# Patient Record
Sex: FEMALE | Race: WHITE | NOT HISPANIC OR LATINO | Employment: FULL TIME | URBAN - METROPOLITAN AREA
[De-identification: names, ages, dates, MRNs, and addresses within clinical notes are randomized per-mention and may not be internally consistent; named-entity substitution may affect disease eponyms.]

---

## 2017-02-20 ENCOUNTER — RADIANT APPOINTMENT (OUTPATIENT)
Dept: GENERAL RADIOLOGY | Facility: CLINIC | Age: 22
End: 2017-02-20
Attending: PHYSICIAN ASSISTANT
Payer: COMMERCIAL

## 2017-02-20 ENCOUNTER — OFFICE VISIT (OUTPATIENT)
Dept: URGENT CARE | Facility: URGENT CARE | Age: 22
End: 2017-02-20
Payer: COMMERCIAL

## 2017-02-20 VITALS
SYSTOLIC BLOOD PRESSURE: 122 MMHG | DIASTOLIC BLOOD PRESSURE: 60 MMHG | WEIGHT: 164 LBS | BODY MASS INDEX: 27.29 KG/M2 | TEMPERATURE: 98.3 F | HEART RATE: 104 BPM | OXYGEN SATURATION: 97 %

## 2017-02-20 DIAGNOSIS — R07.89 ATYPICAL CHEST PAIN: ICD-10-CM

## 2017-02-20 DIAGNOSIS — Z82.49 FAMILY HISTORY OF HYPERTROPHIC CARDIOMYOPATHY: ICD-10-CM

## 2017-02-20 DIAGNOSIS — R07.89 ATYPICAL CHEST PAIN: Primary | ICD-10-CM

## 2017-02-20 DIAGNOSIS — M54.6 ACUTE BILATERAL THORACIC BACK PAIN: ICD-10-CM

## 2017-02-20 LAB
ANION GAP SERPL CALCULATED.3IONS-SCNC: 5 MMOL/L (ref 3–14)
BASOPHILS # BLD AUTO: 0 10E9/L (ref 0–0.2)
BASOPHILS NFR BLD AUTO: 0.2 %
BUN SERPL-MCNC: 15 MG/DL (ref 7–30)
CALCIUM SERPL-MCNC: 9.5 MG/DL (ref 8.5–10.1)
CHLORIDE SERPL-SCNC: 106 MMOL/L (ref 94–109)
CO2 SERPL-SCNC: 28 MMOL/L (ref 20–32)
CREAT SERPL-MCNC: 0.69 MG/DL (ref 0.52–1.04)
D DIMER PPP FEU-MCNC: NORMAL UG/ML FEU (ref 0–0.5)
DIFFERENTIAL METHOD BLD: NORMAL
EOSINOPHIL # BLD AUTO: 0.1 10E9/L (ref 0–0.7)
EOSINOPHIL NFR BLD AUTO: 0.7 %
ERYTHROCYTE [DISTWIDTH] IN BLOOD BY AUTOMATED COUNT: 12.3 % (ref 10–15)
ERYTHROCYTE [SEDIMENTATION RATE] IN BLOOD BY WESTERGREN METHOD: 8 MM/H (ref 0–20)
GFR SERPL CREATININE-BSD FRML MDRD: NORMAL ML/MIN/1.7M2
GLUCOSE SERPL-MCNC: 85 MG/DL (ref 70–99)
HCT VFR BLD AUTO: 44 % (ref 35–47)
HGB BLD-MCNC: 15.4 G/DL (ref 11.7–15.7)
LYMPHOCYTES # BLD AUTO: 3.3 10E9/L (ref 0.8–5.3)
LYMPHOCYTES NFR BLD AUTO: 38.2 %
MCH RBC QN AUTO: 31.2 PG (ref 26.5–33)
MCHC RBC AUTO-ENTMCNC: 35 G/DL (ref 31.5–36.5)
MCV RBC AUTO: 89 FL (ref 78–100)
MONOCYTES # BLD AUTO: 0.7 10E9/L (ref 0–1.3)
MONOCYTES NFR BLD AUTO: 7.4 %
NEUTROPHILS # BLD AUTO: 4.7 10E9/L (ref 1.6–8.3)
NEUTROPHILS NFR BLD AUTO: 53.5 %
PLATELET # BLD AUTO: 278 10E9/L (ref 150–450)
POTASSIUM SERPL-SCNC: 3.7 MMOL/L (ref 3.4–5.3)
RBC # BLD AUTO: 4.94 10E12/L (ref 3.8–5.2)
SODIUM SERPL-SCNC: 139 MMOL/L (ref 133–144)
WBC # BLD AUTO: 8.7 10E9/L (ref 4–11)

## 2017-02-20 PROCEDURE — 71020 XR CHEST 2 VW: CPT

## 2017-02-20 PROCEDURE — 99215 OFFICE O/P EST HI 40 MIN: CPT | Performed by: PHYSICIAN ASSISTANT

## 2017-02-20 PROCEDURE — 93000 ELECTROCARDIOGRAM COMPLETE: CPT | Performed by: PHYSICIAN ASSISTANT

## 2017-02-20 PROCEDURE — 85379 FIBRIN DEGRADATION QUANT: CPT | Performed by: PHYSICIAN ASSISTANT

## 2017-02-20 PROCEDURE — 80048 BASIC METABOLIC PNL TOTAL CA: CPT | Performed by: PHYSICIAN ASSISTANT

## 2017-02-20 PROCEDURE — 36415 COLL VENOUS BLD VENIPUNCTURE: CPT | Performed by: PHYSICIAN ASSISTANT

## 2017-02-20 PROCEDURE — 85652 RBC SED RATE AUTOMATED: CPT | Performed by: PHYSICIAN ASSISTANT

## 2017-02-20 PROCEDURE — 85025 COMPLETE CBC W/AUTO DIFF WBC: CPT | Performed by: PHYSICIAN ASSISTANT

## 2017-02-20 RX ORDER — NAPROXEN 500 MG/1
500 TABLET ORAL 2 TIMES DAILY PRN
Qty: 30 TABLET | Refills: 1 | Status: SHIPPED | OUTPATIENT
Start: 2017-02-20 | End: 2017-10-02

## 2017-02-20 NOTE — PROGRESS NOTES
SUBJECTIVE:   Deonna Cullen is a 21 year old female presenting with a chief complaint of having anterior chest and back pain with breathing, movements and twisting.  Onset of symptoms was this morning .  Course of illness is the same.    Severity moderate  Current and Associated symptoms: anterior and posterior chest wall pain  Treatment measures tried include none.  Predisposing factors include none.    PMH:  Anxiety    Fam Hx:  cardiomyopathy     Allergies   Allergen Reactions     Nkda [No Known Drug Allergies]          Social History   Substance Use Topics     Smoking status: Passive Smoke Exposure - Never Smoker     Smokeless tobacco: Never Used      Comment: Mom smokes     Alcohol use No       ROS:  CONSTITUTIONAL:NEGATIVE for fever, chills, change in weight  INTEGUMENTARY/SKIN: NEGATIVE for worrisome rashes, moles or lesions  ENT/MOUTH: NEGATIVE for ear, mouth and throat problems  RESP:NEGATIVE for significant cough or SOB  CV: NEGATIVE for chest pain, palpitations or peripheral edema  GI: NEGATIVE for nausea, abdominal pain, heartburn, or change in bowel habits  MUSCULOSKELETAL: POSITIVE  for anterior and posterior chest and back pain  VASC: NEGATIVE for cold extremities  PSYCH: POSITIVE for a great deal of anxiety  NEURO: NEGATIVE for weakness, dizziness or paresthesias    OBJECTIVE  :/60 (BP Location: Right arm, Patient Position: Chair, Cuff Size: Adult Regular)  Pulse 104  Temp 98.3  F (36.8  C) (Oral)  Wt 164 lb (74.4 kg)  SpO2 97%  BMI 27.29 kg/m2  GENERAL APPEARANCE: healthy, alert and no distress  HENT: ear canals and TM's normal.  Nose and mouth without ulcers, erythema or lesions  NECK: supple, nontender, no lymphadenopathy  RESP: lungs clear to auscultation - no rales, rhonchi or wheezes  CV: regular rates and rhythm, normal S1 S2, no murmur noted  ABDOMEN:  soft, nontender, no HSM or masses and bowel sounds normal  MS: Positive for anterior chest wall and posterior back, thoracic pain with  movements, exacerbation with palpation  NEURO: Normal strength and tone, sensory exam grossly normal,  normal speech and mentation  SKIN: no suspicious lesions or rashes    Chest xray Negative for acute findings, read by Buddy ROGERS at time of visit.    Results for orders placed or performed in visit on 02/20/17   D dimer quantitative   Result Value Ref Range    D Dimer  0.0 - 0.50 ug/ml FEU     <0.1  This D-dimer assay is intended for use in conjuntion with a clinical pretest   probability assessment model to exclude pulmonary embolism (PE) and as an aid   in the diagnosis of deep venous thrombosis (DVT) in outpatients suspected of PE   or DVT. The cut-off value is 0.5 g/mL FEU.     Erythrocyte sedimentation rate auto   Result Value Ref Range    Sed Rate 8 0 - 20 mm/h   CBC with platelets and differential   Result Value Ref Range    WBC 8.7 4.0 - 11.0 10e9/L    RBC Count 4.94 3.8 - 5.2 10e12/L    Hemoglobin 15.4 11.7 - 15.7 g/dL    Hematocrit 44.0 35.0 - 47.0 %    MCV 89 78 - 100 fl    MCH 31.2 26.5 - 33.0 pg    MCHC 35.0 31.5 - 36.5 g/dL    RDW 12.3 10.0 - 15.0 %    Platelet Count 278 150 - 450 10e9/L    Diff Method Automated Method     % Neutrophils 53.5 %    % Lymphocytes 38.2 %    % Monocytes 7.4 %    % Eosinophils 0.7 %    % Basophils 0.2 %    Absolute Neutrophil 4.7 1.6 - 8.3 10e9/L    Absolute Lymphocytes 3.3 0.8 - 5.3 10e9/L    Absolute Monocytes 0.7 0.0 - 1.3 10e9/L    Absolute Eosinophils 0.1 0.0 - 0.7 10e9/L    Absolute Basophils 0.0 0.0 - 0.2 10e9/L   Basic metabolic panel  (Ca, Cl, CO2, Creat, Gluc, K, Na, BUN)   Result Value Ref Range    Sodium 139 133 - 144 mmol/L    Potassium 3.7 3.4 - 5.3 mmol/L    Chloride 106 94 - 109 mmol/L    Carbon Dioxide 28 20 - 32 mmol/L    Anion Gap 5 3 - 14 mmol/L    Glucose 85 70 - 99 mg/dL    Urea Nitrogen 15 7 - 30 mg/dL    Creatinine 0.69 0.52 - 1.04 mg/dL    GFR Estimate >90  Non  GFR Calc   >60 mL/min/1.7m2    GFR Estimate If Black  >90   GFR Calc   >60 mL/min/1.7m2    Calcium 9.5 8.5 - 10.1 mg/dL     EKG : Negative for ST changes or peaked t - waves.  Regular rate and rhythm    ASSESSMENT/PLAN:      ICD-10-CM    1. Atypical chest pain R07.89 BIOTIN PO     EKG 12-lead complete w/read - Clinics     D dimer quantitative     Erythrocyte sedimentation rate auto     XR Chest 2 Views     CBC with platelets and differential     Basic metabolic panel  (Ca, Cl, CO2, Creat, Gluc, K, Na, BUN)     naproxen (NAPROSYN) 500 MG tablet   2. Acute bilateral thoracic back pain M54.6 BIOTIN PO     EKG 12-lead complete w/read - Clinics     D dimer quantitative     Erythrocyte sedimentation rate auto     XR Chest 2 Views     CBC with platelets and differential     Basic metabolic panel  (Ca, Cl, CO2, Creat, Gluc, K, Na, BUN)     naproxen (NAPROSYN) 500 MG tablet   3. Family history of hypertrophic cardiomyopathy Z82.49        Patient to follow up with her [facility] physician for evaluation for cardiomyopathy.  At this time this appears to be more musculoskeletal in nature  She is to try anti-inflammatory  If any symptoms were to worsen then go to the ED for further evaluation

## 2017-02-20 NOTE — MR AVS SNAPSHOT
"              After Visit Summary   2017    Deonna Cullen    MRN: [id number]           Patient Information     Date Of Birth          1995        Visit Information        Provider Department      2017 12:15 PM Buddy Pedraza PA-C [facility]        Today's Diagnoses     Atypical chest pain    -  1    Acute bilateral thoracic back pain        Family history of hypertrophic cardiomyopathy           Follow-ups after your visit        Who to contact     If you have questions or need follow up information about today's clinic visit or your schedule please contact [facility] directly at [phone].  Normal or non-critical lab and imaging results will be communicated to you by wiMANhart, letter or phone within 4 business days after the clinic has received the results. If you do not hear from us within 7 days, please contact the clinic through wiMANhart or phone. If you have a critical or abnormal lab result, we will notify you by phone as soon as possible.  Submit refill requests through KoalaDeal or call your pharmacy and they will forward the refill request to us. Please allow 3 business days for your refill to be completed.          Additional Information About Your Visit        MyChart Information     KoalaDeal lets you send messages to your doctor, view your test results, renew your prescriptions, schedule appointments and more. To sign up, go to [email or web address] . Click on \"Log in\" on the left side of the screen, which will take you to the Welcome page. Then click on \"Sign up Now\" on the right side of the page.     You will be asked to enter the access code listed below, as well as some personal information. Please follow the directions to create your username and password.     Your access code is: [email or web address]  Expires: 2017  2:47 PM     Your access code will  in 90 days. If you need help or a new code, please call your [place] " clinic or [phone].        Care EveryWhere ID     This is your Care EveryWhere ID. This could be used by other organizations to access your [place] medical records  [id number]        Your Vitals Were     Pulse Temperature Pulse Oximetry BMI (Body Mass Index)          104 98.3  F (36.8  C) (Oral) 97% 27.29 kg/m2         Blood Pressure from Last 3 Encounters:   02/20/17 122/60   04/18/16 98/62   10/07/15 121/71    Weight from Last 3 Encounters:   02/20/17 164 lb (74.4 kg)   04/18/16 157 lb 14.4 oz (71.6 kg)   04/13/15 147 lb 4.8 oz (66.8 kg)              We Performed the Following     Basic metabolic panel  (Ca, Cl, CO2, Creat, Gluc, K, Na, BUN)     CBC with platelets and differential     D dimer quantitative     EKG 12-lead complete w/read - Clinics     Erythrocyte sedimentation rate auto          Today's Medication Changes          These changes are accurate as of: 2/20/17  2:47 PM.  If you have any questions, ask your nurse or doctor.               Start taking these medicines.        Dose/Directions    naproxen 500 MG tablet   Commonly known as:  NAPROSYN   Used for:  Atypical chest pain, Acute bilateral thoracic back pain   Started by:  Buddy Pedraza, DEVANG        Dose:  500 mg   Take 1 tablet (500 mg) by mouth 2 times daily as needed for moderate pain   Quantity:  30 tablet   Refills:  1            Where to get your medicines      These medications were sent to [facility] [address] [place] [address] [address] AT [facility] [place]  [address], [facility] [address]     Phone:  [phone]     naproxen 500 MG tablet                Primary Care Provider    Physician No Ref-Primary       No address on file        Thank you!     Thank you for choosing [facility]  for your care. Our goal is always to provide you with excellent care. Hearing back from our patients is one way we can continue to improve our services. Please take a few  minutes to complete the written survey that you may receive in the mail after your visit with us. Thank you!             Your Updated Medication List - Protect others around you: Learn how to safely use, store and throw away your medicines at www.disposemymeds.org.          This list is accurate as of: 2/20/17  2:47 PM.  Always use your most recent med list.                   Brand Name Dispense Instructions for use    BIOTIN PO          naproxen 500 MG tablet    NAPROSYN    30 tablet    Take 1 tablet (500 mg) by mouth 2 times daily as needed for moderate pain       ondansetron 4 MG ODT tab    ZOFRAN ODT    12 tablet    Take 1-2 tablets (4-8 mg) by mouth every 8 hours as needed for nausea       PROVENTIL  (90 BASE) MCG/ACT Inhaler   Generic drug:  albuterol     1 Inhaler    Inhale 2 puffs into the lungs every 6 hours.

## 2017-02-20 NOTE — NURSING NOTE
"Chief Complaint   Patient presents with     Chest Pain      started suddenly today, radiates through chest to her back, hurts worse when inhaling        Initial /60 (BP Location: Right arm, Patient Position: Chair, Cuff Size: Adult Regular)  Pulse 104  Temp 98.3  F (36.8  C) (Oral)  Wt 164 lb (74.4 kg)  SpO2 97%  BMI 27.29 kg/m2 Estimated body mass index is 27.29 kg/(m^2) as calculated from the following:    Height as of 4/13/15: 5' 5\" (1.651 m).    Weight as of this encounter: 164 lb (74.4 kg).  Medication Reconciliation: complete    "

## 2017-10-02 ENCOUNTER — OFFICE VISIT (OUTPATIENT)
Dept: URGENT CARE | Facility: URGENT CARE | Age: 22
End: 2017-10-02
Payer: COMMERCIAL

## 2017-10-02 VITALS
SYSTOLIC BLOOD PRESSURE: 100 MMHG | HEART RATE: 68 BPM | WEIGHT: 165 LBS | OXYGEN SATURATION: 97 % | DIASTOLIC BLOOD PRESSURE: 60 MMHG | TEMPERATURE: 98.4 F | BODY MASS INDEX: 27.46 KG/M2

## 2017-10-02 DIAGNOSIS — R09.89 CHEST CONGESTION: ICD-10-CM

## 2017-10-02 DIAGNOSIS — R05.8 PRODUCTIVE COUGH: Primary | ICD-10-CM

## 2017-10-02 DIAGNOSIS — R07.0 THROAT PAIN: ICD-10-CM

## 2017-10-02 LAB
DEPRECATED S PYO AG THROAT QL EIA: NORMAL
SPECIMEN SOURCE: NORMAL

## 2017-10-02 PROCEDURE — 87081 CULTURE SCREEN ONLY: CPT | Performed by: PHYSICIAN ASSISTANT

## 2017-10-02 PROCEDURE — 87880 STREP A ASSAY W/OPTIC: CPT | Performed by: PHYSICIAN ASSISTANT

## 2017-10-02 PROCEDURE — 99214 OFFICE O/P EST MOD 30 MIN: CPT | Performed by: PHYSICIAN ASSISTANT

## 2017-10-02 RX ORDER — AZITHROMYCIN 250 MG/1
TABLET, FILM COATED ORAL
Qty: 6 TABLET | Refills: 0 | Status: SHIPPED | OUTPATIENT
Start: 2017-10-02

## 2017-10-02 RX ORDER — IBUPROFEN 200 MG
200 TABLET ORAL EVERY 4 HOURS PRN
COMMUNITY

## 2017-10-02 NOTE — NURSING NOTE
"Chief Complaint   Patient presents with     URI     Pt c/o cough,sore throat and intermittent fever X 4-5 days.     Urgent Care       Initial /60  Pulse 68  Temp 98.4  F (36.9  C)  Wt 165 lb (74.8 kg)  SpO2 97%  BMI 27.46 kg/m2 Estimated body mass index is 27.46 kg/(m^2) as calculated from the following:    Height as of 4/13/15: 5' 5\" (1.651 m).    Weight as of this encounter: 165 lb (74.8 kg).  Medication Reconciliation: complete    "

## 2017-10-02 NOTE — PROGRESS NOTES
SUBJECTIVE:   Deonna Cullen is a 22 year old female presenting with a chief complaint of sore throat, sinus and chest congestion.  Onset of symptoms was over 1 week ago.  Course of illness is worsening.    Severity moderate  Current and Associated symptoms: sinus congestion, sinus pain, throat pain  Treatment measures tried include OTC medications.  Predisposing factors include recent illness.    No past medical history on file.     Allergies   Allergen Reactions     Nkda [No Known Drug Allergies]          Social History   Substance Use Topics     Smoking status: Never Smoker     Smokeless tobacco: Never Used     Alcohol use No       ROS:  CONSTITUTIONAL:NEGATIVE for fever, chills, change in weight  INTEGUMENTARY/SKIN: NEGATIVE for worrisome rashes, moles or lesions  ENT/MOUTH: POSITIVE for throat pain, sinus congestion  RESP:NEGATIVE for significant cough or SOB  CV: NEGATIVE for chest pain, palpitations or peripheral edema  GI: NEGATIVE for nausea, abdominal pain, heartburn, or change in bowel habits  MUSCULOSKELETAL: NEGATIVE for significant arthralgias or myalgia  NEURO: NEGATIVE for weakness, dizziness or paresthesias    OBJECTIVE  :/60  Pulse 68  Temp 98.4  F (36.9  C)  Wt 165 lb (74.8 kg)  SpO2 97%  BMI 27.46 kg/m2  GENERAL APPEARANCE: healthy, alert and no distress  EYES: EOMI,  PERRL, conjunctiva clear  HENT: TM's normal bilaterally, nasal turbinates erythematous, swollen, rhinorrhea purulent, frontal sinus tenderness  and maxillary sinus tenderness   NECK: supple, nontender, no lymphadenopathy  RESP: lungs clear to auscultation - no rales, rhonchi or wheezes  CV: regular rates and rhythm, normal S1 S2, no murmur noted  NEURO: Normal strength and tone, sensory exam grossly normal,  normal speech and mentation  SKIN: no suspicious lesions or rashes    Results for orders placed or performed in visit on 10/02/17   Strep, Rapid Screen   Result Value Ref Range    Specimen Description Throat     Rapid  Strep A Screen       NEGATIVE: No Group A streptococcal antigen detected by immunoassay, await culture report.       ASSESSMENT/PLAN:      ICD-10-CM    1. Productive cough R05 azithromycin (ZITHROMAX) 250 MG tablet     Beta strep group A culture   2. Chest congestion R09.89 azithromycin (ZITHROMAX) 250 MG tablet   3. Throat pain R07.0 MAGIC MOUTHWASH, ENTER INGREDIENTS IN COMMENTS,     Strep, Rapid Screen     Beta strep group A culture       Strep culture pending  Fluids, rest  Follow up with PCP as needed

## 2017-10-03 LAB
BACTERIA SPEC CULT: NORMAL
SPECIMEN SOURCE: NORMAL

## 2017-10-09 ENCOUNTER — OFFICE VISIT (OUTPATIENT)
Dept: URGENT CARE | Facility: URGENT CARE | Age: 22
End: 2017-10-09
Payer: COMMERCIAL

## 2017-10-09 VITALS
BODY MASS INDEX: 27.07 KG/M2 | SYSTOLIC BLOOD PRESSURE: 118 MMHG | WEIGHT: 162.5 LBS | OXYGEN SATURATION: 98 % | TEMPERATURE: 98.8 F | DIASTOLIC BLOOD PRESSURE: 76 MMHG | HEIGHT: 65 IN | HEART RATE: 109 BPM

## 2017-10-09 DIAGNOSIS — R09.89 CHEST CONGESTION: ICD-10-CM

## 2017-10-09 DIAGNOSIS — R05.8 PRODUCTIVE COUGH: Primary | ICD-10-CM

## 2017-10-09 DIAGNOSIS — R05.9 COUGH: ICD-10-CM

## 2017-10-09 DIAGNOSIS — R52 BODY ACHES: ICD-10-CM

## 2017-10-09 PROCEDURE — 99214 OFFICE O/P EST MOD 30 MIN: CPT | Performed by: PHYSICIAN ASSISTANT

## 2017-10-09 RX ORDER — CODEINE PHOSPHATE AND GUAIFENESIN 10; 100 MG/5ML; MG/5ML
5-10 SOLUTION ORAL
Qty: 120 ML | Refills: 0 | Status: SHIPPED | OUTPATIENT
Start: 2017-10-09

## 2017-10-09 RX ORDER — AZITHROMYCIN 250 MG/1
TABLET, FILM COATED ORAL
Qty: 6 TABLET | Refills: 0
Start: 2017-10-09

## 2017-10-09 RX ORDER — IBUPROFEN 800 MG/1
800 TABLET, FILM COATED ORAL EVERY 8 HOURS PRN
Qty: 30 TABLET | Refills: 1 | Status: SHIPPED | OUTPATIENT
Start: 2017-10-09

## 2017-10-09 NOTE — NURSING NOTE
"Chief Complaint   Patient presents with     URI       Initial /76  Pulse 109  Temp 98.8  F (37.1  C) (Oral)  Ht 5' 5\" (1.651 m)  Wt 162 lb 8 oz (73.7 kg)  LMP 09/22/2017  SpO2 98%  BMI 27.04 kg/m2 Estimated body mass index is 27.04 kg/(m^2) as calculated from the following:    Height as of this encounter: 5' 5\" (1.651 m).    Weight as of this encounter: 162 lb 8 oz (73.7 kg).  Medication Reconciliation: complete   Ritika Greer MA   "

## 2017-10-09 NOTE — LETTER
[facility]  [address] [address]  [phone]      October 13, 2017    RE:  Deonna Cullen                                                                                                                                                       [address]   [facility] [address]            To whom it may concern:    Deonna Cullen was seen in the urgent care today for an illness 10/9.  She is requesting a note to be off work this week due to her illness.        Sincerely,        Buddy Pedraza [facility]

## 2017-10-09 NOTE — MR AVS SNAPSHOT
"              After Visit Summary   10/9/2017    Deonna Cullen    MRN: [id number]           Patient Information     Date Of Birth          1995        Visit Information        Provider Department      10/9/2017 10:10 AM Buddy Pedraza PA-C [facility]        Today's Diagnoses     Productive cough    -  1    Chest congestion        Body aches        Cough           Follow-ups after your visit        Who to contact     If you have questions or need follow up information about today's clinic visit or your schedule please contact [facility] directly at [phone].  Normal or non-critical lab and imaging results will be communicated to you by Bellabeathart, letter or phone within 4 business days after the clinic has received the results. If you do not hear from us within 7 days, please contact the clinic through Bellabeathart or phone. If you have a critical or abnormal lab result, we will notify you by phone as soon as possible.  Submit refill requests through [employer] or call your pharmacy and they will forward the refill request to us. Please allow 3 business days for your refill to be completed.          Additional Information About Your Visit        MyChart Information     [employer] lets you send messages to your doctor, view your test results, renew your prescriptions, schedule appointments and more. To sign up, go to [email or web address] . Click on \"Log in\" on the left side of the screen, which will take you to the Welcome page. Then click on \"Sign up Now\" on the right side of the page.     You will be asked to enter the access code listed below, as well as some personal information. Please follow the directions to create your username and password.     Your access code is: [email or web address]  Expires: 2017 11:23 AM     Your access code will  in 90 days. If you need help or a new code, please call your [place] clinic or [phone].        Care " "EveryWhere ID     This is your Care EveryWhere ID. This could be used by other organizations to access your [place] medical records  [id number]        Your Vitals Were     Pulse Temperature Height Last Period Pulse Oximetry BMI (Body Mass Index)    109 98.8  F (37.1  C) (Oral) 5' 5\" (1.651 m) 09/22/2017 98% 27.04 kg/m2       Blood Pressure from Last 3 Encounters:   10/09/17 118/76   10/02/17 100/60   02/20/17 122/60    Weight from Last 3 Encounters:   10/09/17 162 lb 8 oz (73.7 kg)   10/02/17 165 lb (74.8 kg)   02/20/17 164 lb (74.4 kg)              Today, you had the following     No orders found for display         Today's Medication Changes          These changes are accurate as of: 10/9/17 10:52 AM.  If you have any questions, ask your nurse or doctor.               Start taking these medicines.        Dose/Directions    guaiFENesin-codeine 100-10 MG/5ML Soln solution   Commonly known as:  ROBITUSSIN AC   Used for:  Cough, Productive cough, Chest congestion   Started by:  Buddy Pedraza PA-C        Dose:  5-10 mL   Take 5-10 mLs by mouth nightly as needed for cough   Quantity:  120 mL   Refills:  0         These medicines have changed or have updated prescriptions.        Dose/Directions    * azithromycin 250 MG tablet   Commonly known as:  ZITHROMAX   This may have changed:  Another medication with the same name was added. Make sure you understand how and when to take each.   Used for:  Productive cough, Chest congestion   Changed by:  Buddy Pedraza PA-C        2 tabs po qd day 1, then 1 tab po qd days 2-5   Quantity:  6 tablet   Refills:  0       * azithromycin 250 MG tablet   Commonly known as:  ZITHROMAX   This may have changed:  You were already taking a medication with the same name, and this prescription was added. Make sure you understand how and when to take each.   Used for:  Productive cough, Chest congestion   Changed by:  Buddy Pedraza PA-C        2 tabs po qd day 1, then 1 tab po qd days " 2-5   Quantity:  6 tablet   Refills:  0       * ibuprofen 200 MG tablet   Commonly known as:  ADVIL/MOTRIN   This may have changed:  Another medication with the same name was added. Make sure you understand how and when to take each.   Changed by:  Buddy Pedraza PA-C        Dose:  200 mg   Take 200 mg by mouth every 4 hours as needed for mild pain   Refills:  0       * ibuprofen 800 MG tablet   Commonly known as:  ADVIL/MOTRIN   This may have changed:  You were already taking a medication with the same name, and this prescription was added. Make sure you understand how and when to take each.   Used for:  Body aches   Changed by:  Buddy Pedraza PA-C        Dose:  800 mg   Take 1 tablet (800 mg) by mouth every 8 hours as needed for moderate pain   Quantity:  30 tablet   Refills:  1       * Notice:  This list has 4 medication(s) that are the same as other medications prescribed for you. Read the directions carefully, and ask your doctor or other care provider to review them with you.         Where to get your medicines      Some of these will need a paper prescription and others can be bought over the counter.  Ask your nurse if you have questions.     Bring a paper prescription for each of these medications     guaiFENesin-codeine 100-10 MG/5ML Soln solution    ibuprofen 800 MG tablet       You don't need a prescription for these medications     azithromycin 250 MG tablet                Primary Care Provider    Physician No Ref-Primary       NO REF-PRIMARY PHYSICIAN        Equal Access to Services     NAEL SALAZAR AH: Hadii vanessa rogero Sophillip, waaxda luqadaha, qaybta kaalmada adeegmaryann, monica cottrell. So [facility] [phone].    ATENCIÓN: Si habla español, tiene a yates disposición servicios gratuitos de asistencia lingüística. Llame al [phone].    We comply with applicable federal civil rights laws and [place] laws. We do not discriminate on the basis of race, color, national  origin, age, disability, sex, sexual orientation, or gender identity.            Thank you!     Thank you for choosing [facility]  for your care. Our goal is always to provide you with excellent care. Hearing back from our patients is one way we can continue to improve our services. Please take a few minutes to complete the written survey that you may receive in the mail after your visit with us. Thank you!             Your Updated Medication List - Protect others around you: Learn how to safely use, store and throw away your medicines at www.disposemymeds.org.          This list is accurate as of: 10/9/17 10:52 AM.  Always use your most recent med list.                   Brand Name Dispense Instructions for use Diagnosis    * azithromycin 250 MG tablet    ZITHROMAX    6 tablet    2 tabs po qd day 1, then 1 tab po qd days 2-5    Productive cough, Chest congestion       * azithromycin 250 MG tablet    ZITHROMAX    6 tablet    2 tabs po qd day 1, then 1 tab po qd days 2-5    Productive cough, Chest congestion       guaiFENesin-codeine 100-10 MG/5ML Soln solution    ROBITUSSIN AC    120 mL    Take 5-10 mLs by mouth nightly as needed for cough    Cough, Productive cough, Chest congestion       * ibuprofen 200 MG tablet    ADVIL/MOTRIN     Take 200 mg by mouth every 4 hours as needed for mild pain        * ibuprofen 800 MG tablet    ADVIL/MOTRIN    30 tablet    Take 1 tablet (800 mg) by mouth every 8 hours as needed for moderate pain    Body aches       MAGIC MOUTHWASH (ENTER INGREDIENTS IN COMMENTS)     120 mL    Swish and spit 5-10 mLs in mouth every 6 hours as needed Pharmacy please compound 30 ml of Benadryl (12.5 mg/5 ml), 60 ml Maalox and 30 ml Viscous Lidocaine    Throat pain       TYLENOL PO      Take 325 mg by mouth        UNKNOWN TO PATIENT      econozole cream to treat a fungus/yeast on her chest area        VITAMIN C PO      Take by mouth as needed        * Notice:  This list has  4 medication(s) that are the same as other medications prescribed for you. Read the directions carefully, and ask your doctor or other care provider to review them with you.

## 2017-10-09 NOTE — PROGRESS NOTES
"SUBJECTIVE:   Deonna Cullen is a 22 year old female presenting with a chief complaint of chest congestion, productive cough, body aches, fever.  Onset of symptoms was this last week .  Course of illness is worsening.    Severity moderate  Current and Associated symptoms: body aches, fever  Treatment measures tried include just starting taking zpak.  Predisposing factors include recent illness.    PMH  obesity     Allergies   Allergen Reactions     Nkda [No Known Drug Allergies]          Social History   Substance Use Topics     Smoking status: Never Smoker     Smokeless tobacco: Never Used     Alcohol use No       ROS:  CONSTITUTIONAL:POSITIVE  for fever and chills  INTEGUMENTARY/SKIN: NEGATIVE for worrisome rashes, moles or lesions  ENT/MOUTH: positive for sore throat  RESP:POSITIVE for cough-productive and chest congestion  CV: NEGATIVE for chest pain, palpitations or peripheral edema  GI: NEGATIVE for nausea, abdominal pain, heartburn, or change in bowel habits  MUSCULOSKELETAL: positive for body aches  NEURO: NEGATIVE for weakness, dizziness or paresthesias    OBJECTIVE  :/76  Pulse 109  Temp 98.8  F (37.1  C) (Oral)  Ht 5' 5\" (1.651 m)  Wt 162 lb 8 oz (73.7 kg)  LMP 09/22/2017  SpO2 98%  BMI 27.04 kg/m2  GENERAL APPEARANCE: healthy, alert and no distress  EYES: EOMI,  PERRL, conjunctiva clear  HENT: TM's normal bilaterally, nasal turbinates erythematous, swollen and rhinorrhea   NECK: supple, nontender, no lymphadenopathy  RESP: lungs clear to auscultation - no rales, rhonchi or wheezes  CV: regular rates and rhythm, normal S1 S2, no murmur noted  ABDOMEN:  soft, nontender, no HSM or masses and bowel sounds normal  NEURO: Normal strength and tone, sensory exam grossly normal,  normal speech and mentation  SKIN: no suspicious lesions or rashes    ASSESSMENT/PLAN:      ICD-10-CM    1. Productive cough R05 guaiFENesin-codeine (ROBITUSSIN AC) 100-10 MG/5ML SOLN solution     azithromycin (ZITHROMAX) 250 " MG tablet   2. Chest congestion R09.89 guaiFENesin-codeine (ROBITUSSIN AC) 100-10 MG/5ML SOLN solution     azithromycin (ZITHROMAX) 250 MG tablet   3. Body aches R52 ibuprofen (ADVIL/MOTRIN) 800 MG tablet   4. Cough R05 guaiFENesin-codeine (ROBITUSSIN AC) 100-10 MG/5ML SOLN solution         Increase fluids, rest  May still use tylenol  Follow up with PCP as needed  See orders in Epic

## 2017-10-09 NOTE — LETTER
[facility]  [address] [address]  [phone]      October 9, 2017    RE:  Deonna Cullen                                                                                                                                                       [address]   [facility] [id number]            To whom it may concern:    Deonna Cullen was seen in the urgent care today for an illness.  She will miss work today and tomorrow.        Sincerely,        Buddy Pedraza [facility]

## 2020-10-21 ENCOUNTER — APPOINTMENT (OUTPATIENT)
Dept: GENERAL RADIOLOGY | Facility: CLINIC | Age: 25
End: 2020-10-21
Attending: PHYSICIAN ASSISTANT
Payer: COMMERCIAL

## 2020-10-21 ENCOUNTER — HOSPITAL ENCOUNTER (EMERGENCY)
Facility: CLINIC | Age: 25
Discharge: HOME | End: 2020-10-21
Attending: PHYSICIAN ASSISTANT | Admitting: PHYSICIAN ASSISTANT
Payer: COMMERCIAL

## 2020-10-21 VITALS
WEIGHT: 165 LBS | SYSTOLIC BLOOD PRESSURE: 141 MMHG | OXYGEN SATURATION: 100 % | TEMPERATURE: 98.5 F | HEART RATE: 95 BPM | DIASTOLIC BLOOD PRESSURE: 69 MMHG | BODY MASS INDEX: 27.46 KG/M2

## 2020-10-21 DIAGNOSIS — R07.9 CHEST PAIN: ICD-10-CM

## 2020-10-21 DIAGNOSIS — R06.02 SHORTNESS OF BREATH: ICD-10-CM

## 2020-10-21 LAB
ANION GAP SERPL CALCULATED.3IONS-SCNC: 7 MMOL/L (ref 3–14)
BASOPHILS # BLD AUTO: 0 10E9/L (ref 0–0.2)
BASOPHILS NFR BLD AUTO: 0.2 %
BUN SERPL-MCNC: 11 MG/DL (ref 7–30)
CALCIUM SERPL-MCNC: 9.2 MG/DL (ref 8.5–10.1)
CHLORIDE SERPL-SCNC: 108 MMOL/L (ref 94–109)
CO2 SERPL-SCNC: 22 MMOL/L (ref 20–32)
CREAT SERPL-MCNC: 0.6 MG/DL (ref 0.52–1.04)
DIFFERENTIAL METHOD BLD: NORMAL
EOSINOPHIL # BLD AUTO: 0 10E9/L (ref 0–0.7)
EOSINOPHIL NFR BLD AUTO: 0.3 %
ERYTHROCYTE [DISTWIDTH] IN BLOOD BY AUTOMATED COUNT: 12.4 % (ref 10–15)
GFR SERPL CREATININE-BSD FRML MDRD: >90 ML/MIN/{1.73_M2}
GLUCOSE SERPL-MCNC: 75 MG/DL (ref 70–99)
HCT VFR BLD AUTO: 40.1 % (ref 35–47)
HGB BLD-MCNC: 14.1 G/DL (ref 11.7–15.7)
IMM GRANULOCYTES # BLD: 0 10E9/L (ref 0–0.4)
IMM GRANULOCYTES NFR BLD: 0.2 %
INTERPRETATION ECG - MUSE: NORMAL
LYMPHOCYTES # BLD AUTO: 3.3 10E9/L (ref 0.8–5.3)
LYMPHOCYTES NFR BLD AUTO: 38 %
MCH RBC QN AUTO: 31.2 PG (ref 26.5–33)
MCHC RBC AUTO-ENTMCNC: 35.2 G/DL (ref 31.5–36.5)
MCV RBC AUTO: 89 FL (ref 78–100)
MONOCYTES # BLD AUTO: 0.7 10E9/L (ref 0–1.3)
MONOCYTES NFR BLD AUTO: 7.5 %
NEUTROPHILS # BLD AUTO: 4.7 10E9/L (ref 1.6–8.3)
NEUTROPHILS NFR BLD AUTO: 53.8 %
NRBC # BLD AUTO: 0 10*3/UL
NRBC BLD AUTO-RTO: 0 /100
PLATELET # BLD AUTO: 250 10E9/L (ref 150–450)
POTASSIUM SERPL-SCNC: 3.9 MMOL/L (ref 3.4–5.3)
RBC # BLD AUTO: 4.52 10E12/L (ref 3.8–5.2)
SODIUM SERPL-SCNC: 137 MMOL/L (ref 133–144)
TROPONIN I SERPL-MCNC: <0.015 UG/L (ref 0–0.04)
TROPONIN I SERPL-MCNC: <0.015 UG/L (ref 0–0.04)
WBC # BLD AUTO: 8.7 10E9/L (ref 4–11)

## 2020-10-21 PROCEDURE — 84484 ASSAY OF TROPONIN QUANT: CPT | Performed by: PHYSICIAN ASSISTANT

## 2020-10-21 PROCEDURE — 71045 X-RAY EXAM CHEST 1 VIEW: CPT

## 2020-10-21 PROCEDURE — 99285 EMERGENCY DEPT VISIT HI MDM: CPT | Mod: 25

## 2020-10-21 PROCEDURE — 85025 COMPLETE CBC W/AUTO DIFF WBC: CPT | Performed by: PHYSICIAN ASSISTANT

## 2020-10-21 PROCEDURE — 80048 BASIC METABOLIC PNL TOTAL CA: CPT | Performed by: PHYSICIAN ASSISTANT

## 2020-10-21 PROCEDURE — 93005 ELECTROCARDIOGRAM TRACING: CPT

## 2020-10-21 ASSESSMENT — ENCOUNTER SYMPTOMS
HEMATURIA: 0
BACK PAIN: 0
COUGH: 0
DIFFICULTY URINATING: 0
SORE THROAT: 0
SHORTNESS OF BREATH: 1
VOMITING: 0
ABDOMINAL PAIN: 0
MYALGIAS: 0
DYSURIA: 0
RHINORRHEA: 0
FEVER: 0
BLOOD IN STOOL: 0
DIARRHEA: 0
NUMBNESS: 0
CHILLS: 0
LIGHT-HEADEDNESS: 1
NERVOUS/ANXIOUS: 1
NAUSEA: 0

## 2020-10-21 NOTE — ED PROVIDER NOTES
History     Chief Complaint:  Chest Pain       HPI   Deonna Cullen is a 25 year old female who presents for an evaluation after a 10 minute episode of chest pain at 0945. The patient says that she was at work when she had an onset of midsternal chest pain. She says that she started to panic a bit when it started and she became lightheaded and short of breath as well, noting that she felt like she was going to pass out. She says that she sat down and started shaking and crying and thought that she may be having a panic attack. The patient says that after the initial 10 minutes her chest pain lessened and became more intermittent. She describes the chest pain as her heart being squeezed, but also sharp. The patient says that here in the ED she is not currently having any symptoms. She denies any syncope, head injury, fever, chills, cough, runny nose, sore throat, congestion, back pain, abdominal pain, nausea, vomiting, diarrhea, leg swelling, urinary symptoms, bloody stools, or any arm pain/numbness.     Allergies:  No Known Drug Allergies      Medications:    Ibuprofen  Robitussin  Zithromax    Past Medical History:    Depression with anxiety  Insomnia     Past Surgical History:    [place] teeth extraction     Family History:    Hypertrophic cardiomyopathy      Social History:  Smoking Status: Never Smoker  Smokeless Tobacco: Never Used  Alcohol Use: No  Drug Use: No    Review of Systems   Constitutional: Negative for chills and fever.   HENT: Negative for congestion, rhinorrhea and sore throat.    Respiratory: Positive for shortness of breath. Negative for cough.    Cardiovascular: Positive for chest pain. Negative for leg swelling.   Gastrointestinal: Negative for abdominal pain, blood in stool, diarrhea, nausea and vomiting.   Genitourinary: Negative for difficulty urinating, dysuria and hematuria.   Musculoskeletal: Negative for back pain and myalgias.   Neurological: Positive for light-headedness. Negative for  syncope and numbness.   Psychiatric/Behavioral: The patient is nervous/anxious.    All other systems reviewed and are negative.      Physical Exam     Patient Vitals for the past 24 hrs:   BP Temp Temp src Pulse SpO2 Weight   10/21/20 1313 (!) 141/69 98.5  F (36.9  C) Oral 95 100 % 74.8 kg (165 lb)        Physical Exam  Constitutional: Pleasant. Cooperative.   Eyes: Pupils equally round and reactive  HENT: Head is normal in appearance. Oropharynx is normal with moist mucus membranes.  Cardiovascular: Regular rate and rhythm and without murmurs.  Respiratory: Normal respiratory effort, lungs are clear bilaterally.  GI: Abdomen is soft, non-tender, non-distended. No guarding, rebound, or rigidity.  Musculoskeletal: No asymmetry of the lower extremities, no tenderness to palpation.   Skin: Normal, without rash.  Neurologic: Cranial nerves grossly intact, normal cognition, no focal deficits. Alert and oriented x 3.   Psychiatric: Normal affect.  Nursing notes and vital signs reviewed.      Emergency Department Course     ECG:  ECG taken at 1315, ECG read at 1335  Normal sinus rhythm  Normal ECG  No significant change compared to EKG from 10/19/2012  Rate 82 bpm. WY interval 136 ms. QRS duration 90 ms. QT/QTc 368/429 ms. P-R-T axes 76 5 50.    Imaging:  Radiology findings were communicated with the patient who voiced understanding of the findings.    XR Chest Port 1 View  No airspace consolidation, pneumothorax, or pleural  effusion.  GIORGIO CURIEL MD  Reading per radiology     Laboratory:  Laboratory findings were communicated with the patient who voiced understanding of the findings.    CBC: WBC 8.7, HGB 14.1,   BMP: WNL (Creatinine 0.60)  Troponin (Collected 1349): <0.015   Troponin (Collected 1551): <0.015     Emergency Department Course:    1316 Nursing notes and vitals reviewed.    1326 I performed an exam of the patient as documented above.     PERC Rule for risk stratifying PE to low risk  (calculator)  Background  Risk stratifies patients to low risk of PE if all 8 criteria are present including age <50, heart rate <100, O2 Sat >94%, no unilateral leg edema, no hemoptysis, no recent surgery or trauma, no prior VTE, and no hormone use.  Data  25 year old  does not have a problem list on file.  @cmedp@   has no past surgical history on file.  Pulse: 95  SpO2: 100 %  Criteria   Of  8 possible items (all criteria must be present):  Age <50 years  Heart rate <100 bpm  Oxygen Saturation >94%  No unilateral leg swelling  No hemoptysis  No surgery or trauma within 4 weeks  No prior DVT or PE  No hormone use (oral, transdermal and intravaginal estrogens)  Interpretation  All eight criteria are met AND low clinical PE suspicion: No further evaluation for PE required    1349 IV was inserted and blood was drawn for laboratory testing, results above.     1459 The patient was sent for a XR while in the emergency department, results above.      HEART Score  Background  Calculates the overall risk of adverse event in patient's presenting with chest pain.  Based on 5 criteria (each assigned 0-2 points) including suspiciousness of history, EKG, age, risk factors and troponin.    Data  25 year old female  does not have a problem list on file.   reports that she has never smoked. She has never used smokeless tobacco.  family history is not on file.  Lab Results   Component Value Date    TROPI <0.015 10/21/2020     Criteria   0-2 points for each of 5 items (maximum of 10 points):  Score 0- History slightly suspicious for coronary syndrome  Score 0- EKG Normal  Score 0- Age <45 years old  Score 0- No risk factors for atherosclerotic disease  Score 0- Within normal limits for troponin levels  Interpretation  Risk of adverse outcome  Heart Score: 0  Total Score 0-3- Adverse Outcome Risk 2.5% - Supports early discharge with appropriate follow-up    1544 Patient rechecked and updated.      1623 Patient rechecked and updated.        Findings and plan explained to the Patient and father. Patient discharged home with instructions regarding supportive care, medications, and reasons to return. The importance of close follow-up was reviewed.    Impression & Plan      Medical Decision Making:  Deonna Cullen is a 25 year old female who presents to the emergency department today for evaluation of chest pain.  Patient notes onset chest pain that began this a.m. while at work.  She notes associated lightheadedness and dyspnea.  See HPI as above for additional details.  Vitals and physical exam as above.  Differential is broad and included ACS, dissection, PE, pneumothorax, GERD, MSK, pneumonia, among others.  The above work-up was obtained.  Discussed with patient the unclear etiology of her symptoms at this time.  Of low suspicion for any nefarious etiology listed above.  MSK versus GERD is certainly a possibility.  Patient does note a history of anxiety, and this may certainly play some role.  Patient has a heart score of 0, supporting discharge to home with outpatient follow-up.  Delta troponins are negative.  [place] patient was safe for discharge to home. Discussed reasons to return. All questions answered. Patient discharged to home in stable condition.    Diagnosis:    ICD-10-CM    1. Chest pain  R07.9    2. Shortness of breath  R06.02      Disposition:   The patient is discharged to home.     Discharge Medications:  New Prescriptions    No medications on file       Scribe Disclosure:  I, Kael Mckinney, am serving as a scribe at 1:17 PM on 10/21/2020 to document services personally performed by Lex Palacios PA-C based on my observations and the provider's statements to me.    This record was created at least in part using electronic voice recognition software, so please excuse any typographical errors.          Lex Palacios PA-C  10/21/20 3013

## 2020-10-21 NOTE — ED TRIAGE NOTES
At 0930 while the patient was at work she started to have chest pain, she also felt like she was going to pass out during this.  Now her chest pain is intermittent and here in triage she has no chest pain. VSS, EKG done

## 2020-10-21 NOTE — ED NOTES
Bed: ED12  Expected date: 10/21/20  Expected time: 1:06 PM  Means of arrival:   Comments:  Triage AA